# Patient Record
Sex: MALE | Race: WHITE
[De-identification: names, ages, dates, MRNs, and addresses within clinical notes are randomized per-mention and may not be internally consistent; named-entity substitution may affect disease eponyms.]

---

## 2017-06-26 ENCOUNTER — HOSPITAL ENCOUNTER (OUTPATIENT)
Dept: HOSPITAL 45 - C.RAD | Age: 82
Discharge: HOME | End: 2017-06-26
Attending: INTERNAL MEDICINE
Payer: COMMERCIAL

## 2017-06-26 DIAGNOSIS — R13.12: Primary | ICD-10-CM

## 2017-06-26 DIAGNOSIS — R63.3: ICD-10-CM

## 2017-06-26 NOTE — SWALLOWING EVALUATION
HISTORY: This 83 year-old man was referred for a VFSS at [facility] in 
order to address c/o solid food dysphagia and occasional nasal reguritation. He reported 
being able to clear globus sensation with water. Patient has a PMH significant for 
coronary artery disease, diabetes, CABG, hernia repair, right hip replacement. He also 
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  He had no 
information regarding results of EGD.  Currently the patient's diet level is regular, but 
he reports needing to eat foods that are moist/slippery and needing to drink a lot of 
water with meals. 

PROCEDURE: The patient was seen in the Radiology Department of [facility] for the VFSS. Cursory examination of the oral cavity revealed adequate dentition. 
Movement of the articulators was WNL.

The patient was seated on a stool and was viewed in both the Anterior-Posterior (A-P) and 
Lateral planes. Volitional phonation exercises completed in the A-P plane revealed 
bilateral vocal fold movement and vocal intensity within functional limits.

In the lateral plane, the patient was given the following boluses: 1 tsp. thin liquid 
barium x 2, sequential swallows of thin liquid barium self-presented from a cup, 1 tsp. 
nectar-thick liquid barium, single swallow nectar-thick liquid barium self-presented from 
a cup, 1 tsp. barium pudding, and 1 club cracker with barium pudding.

The patient was then repositioned into the A-P plane and given 1 tsp. barium pudding.

RESULTS: 

Oral Stage: Labial closure and tongue control were adequate. Mastication was timely and 
efficient. Bolus transport was timely with brisk tongue motion. There was complete oral 
clearance. Swallow was timely with the head of the bolus at the posterior angle of ramus 
at the first hyoid excursion. 

Pharyngeal Stage: Soft palate elevation was complete. There was partial laryngeal 
elevation with partial superior movement of the thyroid cartilage/partial approximation of 
arytenoids to epiglottic petiole. Hyoid excursion was complete. Epiglottic inversion was 
complete. Laryngeal vestibular closure was incomplete with a narrow column of contrast 
present. Pharyngeal stripping wave was present but diminished. Pharyngeal contraction 
showed bilateral bulging. Pharyngoesophageal segment opening had partial 
distention/partial duration. Tongue base retraction was reduced with a trace column of 
contrast between the tongue base and posterior wall. There was a collection of residue on 
the pharyngeal structures with residue stuck on the posterior pharyngeal wall, valleculae, 
and pyriforms, but was cleared with a second swallow.

Patient showed no aspiration throughout the study. Bilateral bulging in pharyngeal 
contraction represents muscle weakness. Patient frequently requires multiple swallows to 
clear boluses from the pharyngeal cavity, but is aware of the problem and alternates solid 
food with sips of water. 

Esophageal Stage: There was mid-distal retention in the esophagus.



SUMMARY/RECOMMENDATIONS: This patient presents with mild pharyngeal dysphagia and s/s 
esophageal dysfunction. The following is recommended:

1. Slippery diet. Choose foods that are moist, loose, and slippery. Avoid foods that are 
thick, pasty, and doughy. Use condiments as needed to make foods slippery.

2. GERD precautions: patient should be seated up fully upright during and for 30 minutes 
after meals. Alternate solids and liquids. Use multiple swallows as necessary to clear 
boluses.

3. Patient may benefit from f/u speech therapy to learn exercises to increase strength of 
swallowing muscles.  This may be available via Home Health or Outpatient Speech Services. 

A summary of the results and recommendations was discussed with patient and understanding 
was verbalized immediately following the study. 

Thank you for referral of this patient. Please contact me at [phone] if any 
additional information is needed

Jesika Leija [facility]

## 2017-06-26 NOTE — DIAGNOSTIC IMAGING REPORT
VIDEO SWALLOW



HISTORY: Dysphagia  INABILITY TO SWALLOW Solids, weight LOSS



TECHNIQUE: Video fluoroscopic evaluation of swallowing was performed in the AP

and lateral projections by the speech pathology staff. The patient is fed

nectar-thick and thin liquid barium, a barium coated wafer, and barium pudding. 



FLUOROSCOPY TIME: 2 minutes.



COMPARISON STUDY:  None.



FINDINGS: There is normal hyoid excursion and epiglottic deflection. No

significant penetration or aspiration identified. Swallowing function is within

normal limits.



IMPRESSION:  



1. No aspiration identified.

2. Please see the speech pathologist report for detailed findings and

recommendations.







Electronically signed by:  Alex Medellin M.D.

6/26/2017 11:58 AM



Dictated Date/Time:  6/26/2017 11:57 AM

## 2018-03-28 ENCOUNTER — HOSPITAL ENCOUNTER (INPATIENT)
Dept: HOSPITAL 45 - C.EDB | Age: 83
LOS: 2 days | Discharge: HOME | DRG: 247 | End: 2018-03-30
Attending: HOSPITALIST | Admitting: HOSPITALIST
Payer: COMMERCIAL

## 2018-03-28 VITALS
WEIGHT: 133.16 LBS | BODY MASS INDEX: 20.18 KG/M2 | WEIGHT: 133.16 LBS | HEIGHT: 68 IN | HEIGHT: 68 IN | BODY MASS INDEX: 20.18 KG/M2

## 2018-03-28 VITALS
OXYGEN SATURATION: 99 % | HEART RATE: 64 BPM | DIASTOLIC BLOOD PRESSURE: 69 MMHG | SYSTOLIC BLOOD PRESSURE: 125 MMHG | TEMPERATURE: 98.06 F

## 2018-03-28 DIAGNOSIS — Z96.641: ICD-10-CM

## 2018-03-28 DIAGNOSIS — I10: ICD-10-CM
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
## 2018-03-28 DIAGNOSIS — Z95.5: ICD-10-CM

## 2018-03-28 DIAGNOSIS — Z95.1: ICD-10-CM
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
## 2018-03-28 DIAGNOSIS — Z79.84: ICD-10-CM

## 2018-03-28 DIAGNOSIS — K22.70: ICD-10-CM

## 2018-03-28 DIAGNOSIS — E11.9: ICD-10-CM

## 2018-03-28 DIAGNOSIS — E03.9: ICD-10-CM

## 2018-03-28 DIAGNOSIS — I25.110: Primary | ICD-10-CM

## 2018-03-28 DIAGNOSIS — T82.857A: ICD-10-CM

## 2018-03-28 DIAGNOSIS — E78.5: ICD-10-CM

## 2018-03-28 DIAGNOSIS — Z85.46: ICD-10-CM

## 2018-03-28 DIAGNOSIS — Z82.49: ICD-10-CM

## 2018-03-28 LAB
ALBUMIN SERPL-MCNC: 3.7 GM/DL (ref 3.4–5)
ALP SERPL-CCNC: 82 U/L (ref 45–117)
ALT SERPL-CCNC: 20 U/L (ref 12–78)
AST SERPL-CCNC: 18 U/L (ref 15–37)
BUN SERPL-MCNC: 17 MG/DL (ref 7–18)
CALCIUM SERPL-MCNC: 8.2 MG/DL (ref 8.5–10.1)
CO2 SERPL-SCNC: 25 MMOL/L (ref 21–32)
CREAT SERPL-MCNC: 1.11 MG/DL (ref 0.6–1.4)
EOSINOPHIL NFR BLD AUTO: 250 K/UL (ref 130–400)
GLUCOSE SERPL-MCNC: 212 MG/DL (ref 70–99)
HCT VFR BLD CALC: 35 % (ref 42–52)
HGB BLD-MCNC: 12.5 G/DL (ref 14–18)
INR PPP: 1 (ref 0.9–1.1)
MCH RBC QN AUTO: 34.8 PG (ref 25–34)
MCHC RBC AUTO-ENTMCNC: 35.7 G/DL (ref 32–36)
MCV RBC AUTO: 97.5 FL (ref 80–100)
PMV BLD AUTO: 9.8 FL (ref 7.4–10.4)
POTASSIUM SERPL-SCNC: 3.9 MMOL/L (ref 3.5–5.1)
PROT SERPL-MCNC: 7.4 GM/DL (ref 6.4–8.2)
PTT PATIENT: 25.9 SECONDS (ref 21–31)
RED CELL DISTRIBUTION WIDTH CV: 12.7 % (ref 11.5–14.5)
RED CELL DISTRIBUTION WIDTH SD: 44.9 FL (ref 36.4–46.3)
SODIUM SERPL-SCNC: 134 MMOL/L (ref 136–145)
WBC # BLD AUTO: 7.32 K/UL (ref 4.8–10.8)

## 2018-03-28 RX ADMIN — INSULIN ASPART SCH UNITS: 100 INJECTION, SOLUTION INTRAVENOUS; SUBCUTANEOUS at 21:00

## 2018-03-28 RX ADMIN — HEPARIN SODIUM SCH UNIT: 10000 INJECTION, SOLUTION INTRAVENOUS; SUBCUTANEOUS at 23:48

## 2018-03-28 NOTE — EMERGENCY ROOM VISIT NOTE
History


Report prepared by Eboni:  iB Ryan


Under the Supervision of:  Dr. Lb Mcbride M.D.


First contact with patient:  13:49


Chief Complaint:  CHEST PAIN


Stated Complaint:  CHEST PAIN


Nursing Triage Summary:  


pt here with intermittent chest pains x one month. pt states today had burning 


in chest into left arm. upon ems arrival pt denied any pain, took 4 baby 

aspirin 


prior to arrival. pt denies any sob. pt states hx of 5 stents, no hx of mi





History of Present Illness


The patient is an 84 year old male who presents to the Emergency Room with 

complaints of now-resolving chest pain that began at 1210 this afternoon, 1 

hour and 40 minutes ago. The patient states that he has been experiencing 

intermittent "tiny sharp" pains for the past couple of months. Today at 1210 

these symptoms worsened. He had a sharp pain that was a 4/10 in severity. The 

patient then developed some pain in the left arm. This episode lasted for about 

30 minutes before resolving spontaneously. There was no nausea or diaphoresis 

at any time. He had some Aspirin prior to arrival. Over the past couple of 

months he has also noticed some unusual shortness of breath with exertion. He 

notes being "dead tired" after walking up his basement steps. Exertion has not 

caused the sharp pains to onset or worsen. He does have a history of 5 cardiac 

stents placed with a Coronary Artery Bypass Graft. He has never had a 

myocardial infarction.





   Source of History:  patient


   Onset:  1 hr 40 min ago


   Position:  chest, arm (left)


   Symptom Intensity:  4/10 in severity


   Quality:  sharp ("tiny sharp")


   Timing:  resolved (resovling ), other (Episode lasted roughly 30 minutes)


   Associated Symptoms:  + SOB, No diaphoresis, No nausea





Review of Systems


See HPI for pertinent positives & negatives. A total of 10 systems reviewed and 

were otherwise negative.





Past Medical & Surgical


Medical Problems:


(1) Dye esophagus


(2) Chest pain


(3) DM type 2 (diabetes mellitus, type 2)


(4) Dyslipidemia


(5) HTN (hypertension)


(6) Hypothyroidism


(7) Iron deficiency anemia


(8) Kidney stone


(9) Kidney stones


(10) Prostate CA


Surgical Problems:


(1) History of right hip replacement


(2) History of right shoulder fracture


(3) Hx of CABG


(4) Hx of heart artery stent


(5) Hx of prostatectomy


(6) Hx of thyroidectomy








Diabetes





Family History


Omitted secondary to age.





Social History


Marital Status:  


Housing Status:  lives with significant other





Current/Historical Medications


Scheduled


Allopurinol (Zyloprim), 300 MG PO DAILY


Aspirin (Aspirin), 81 MG PO DAILY


Atorvastatin (Lipitor), 20 MG PO DAILY


Fluticasone Propionate (Nasal) (Flonase Allergy Relief), 2 SPRAYS KRYSTAL DAILY


Lansoprazole (Prevacid Solutab), 30 MG PO DAILY


Levothyroxine Sodium (Synthroid), 88 MCG PO DAILY


Lisinopril (Zestril), 2.5 MG PO DAILY


Lutein-Zeaxanthin (Ocuvite Lutein 25 25-5 mg), 1 CAP PO DAILY


Metformin Hcl Er (Glucophage Er), 500 MG PO BID





Allergies


Coded Allergies:  


     No Known Allergies (Unverified , 3/28/18)





Physical Exam


Vital Signs











  Date Time  Temp Pulse Resp B/P (MAP) Pulse Ox O2 Delivery O2 Flow Rate FiO2


 


3/28/18 16:06  73 17 154/72 98 Room Air  


 


3/28/18 15:30  69 20 141/74 97 Room Air  


 


3/28/18 15:00  68 12 136/71 97 Room Air  


 


3/28/18 14:27  66 16 140/79 96 Room Air  


 


3/28/18 13:46 36.6 83 16 165/77 97 Room Air  


 


3/28/18 13:44  81      











Physical Exam


GENERAL: Patient is in no acute distress.


HEENT: No acute trauma, normocephalic atraumatic, mucous membranes moist, no 

nasal congestion, no scleral icterus.


NECK: No stridor, no adenopathy, no meningismus, trachea is midline.


LUNGS: Scattered crackles bilaterally, no wheezing. Breath sounds are equal. No 

respiratory distress. 


HEART: Irregular rhythm, with a normal rate, no murmurs. 


ABDOMEN: Soft, nontender, bowel sounds positive, no hernias, no peritonitis.


EXTREMITIES: No cyanosis or edema, full range of motion of all the joints 

without pain or difficulty, no signs for acute trauma.


NEUROLOGIC: Oriented x 3, no acute motor or sensory deficits, no focal weakness.


SKIN: No rash, no jaundice, no diaphoresis.





Medical Decision & Procedures


ER Provider


Diagnostic Interpretation:


Radiology results as stated below per my review and radiologist interpretation:





CHEST ONE VIEW PORTABLE





CLINICAL HISTORY: CHEST PAIN dyspnea





COMPARISON STUDY:  No previous studies for comparison.





FINDINGS: Slight interstitial prominence throughout both hemithoraces. No


evidence for well-defined infiltrate. Prior median sternotomy. Postoperative


changes to the left shoulder. Old healed fracture right shoulder with


superimposed degenerative change. 





IMPRESSION:  Nonspecific bilateral interstitial prominence. 

















The above report was generated using voice recognition software.  It may contain


grammatical, syntax or spelling errors.














Electronically signed by:  Alex Medellin M.D.


3/28/2018 2:28 PM





Dictated Date/Time:  3/28/2018 2:27 PM





Laboratory Results











Test


  3/28/18


13:09 3/28/18


14:06


 


Prothrombin Time


  10.4 SECONDS


(9.0-12.0) 


 


 


Prothromb Time International


Ratio 1.0 (0.9-1.1) 


  


 


 


Activated Partial


Thromboplast Time 25.9 SECONDS


(21.0-31.0) 


 


 


Partial Thromboplastin Ratio 1.0  


 


Magnesium Level


  1.9 mg/dl


(1.8-2.4) 


 


 


Total Bilirubin


  0.4 mg/dl


(0.2-1) 


 


 


Aspartate Amino Transf


(AST/SGOT) 18 U/L (15-37) 


  


 


 


Alanine Aminotransferase


(ALT/SGPT) 20 U/L (12-78) 


  


 


 


Alkaline Phosphatase


  82 U/L


() 


 


 


Total Protein


  7.4 gm/dl


(6.4-8.2) 


 


 


Albumin


  3.7 gm/dl


(3.4-5.0) 


 


 


Globulin


  3.7 gm/dl


(2.5-4.0) 


 


 


Albumin/Globulin Ratio 1.0 (0.9-2)  


 


Bedside Troponin I


  


  < 0.030 ng/ml


(0-0.045)





Laboratory results reviewed by me.





ECG Per My Interpretation


Indication:  chest pain


Rate (beats per minute):  70


Rhythm:  normal sinus


Findings:  other (No CRUZ, No PVCs)





ED Course


1351: The patient was evaluated in room B8. A complete history and physical 

exam was performed.





1509: I discussed the case with Dr. Lambert - Cardiology. He suggests discussing 

the case with the patient and seeing if he would like me to talk to Dr. Oleary - Patient's cardiologist from [place], and see what he would like 

to have done. 





1518: I discussed the case with the patient. He would like that I consult Dr. Villafuerte - [place] [facility]. 





1528: I discussed the case with Dr. Noy Carcamo. He 

thinks we should have the patient brought in to the hospital for a 

catheterization. 





1546: I discussed the case with Trina Schreckengost - [facility] PA-

C. She will evaluate the patient for further treatment. I made Dr. Lambert aware 

of this development.





Medical Decision


Differential Diagnosis includes; angina, myocardial infarction, anemia, 

electrolyte imbalance, aortic dissection, pulmonary embolism, musculoskeletal 

pain. 





There is no leukocytosis or concerning anemia.  No significant electrolyte 

abnormality, kidney failure or hepatitis.  EKG shows a sinus rhythm with PACs, 

no acute ischemia.  Cardiac enzyme testing 1 is not consistent with acute 

cardiac injury.  Chest x-ray does not show pneumonia, pneumothorax or 

mediastinal widening.





The patient presents with chest pain.  The pain is now resolved.  He does have 

a long cardiac history.  [facility]  

They recommended I discuss the case with his primary cardiologist in the 

[place] area.  I did discuss the case with his cardiologist and he actually 

recommended a hospital stay for further workup, possibly a cardiac 

catheterization.





I talked to the patient, he did consent to a hospital stay.  I discussed the 

case with case management.  I did consult the on-call hospitalist and we 

discussed the case.  [facility] cardiology is involved as well.





Medication Reconcilliation


Current Medication List:  was personally reviewed by me





Blood Pressure Screening


Patient's blood pressure:  Elevated blood pressure


Referred to hospitalist.





Consults


Time Called:  1505


Consulting Physician:  Dr. Fausto Carcamo


Returned Call:  1509


I discussed the case with Dr. Fausto Carcamo. He suggests discussing the 

case with Dr. Oleary - Patient's cardiologist from [place], and see what 

he would like to have done.


Additional Consults:  


   Time Called:  1521


   Consulted Physician:  Dr. Noy [facility] Card[facility]


   Returned Call:  1525


Additional Comments:


I discussed the case with Dr. Noy Carcamo. He thinks we 

should have the patient brought in to the hospital for a catheterization.


   Time Called:  1542


   Consulted Physician:   Trina Schreckengost - [facility] PA-C


   Returned Call:  1540


Additional Comments:


I discussed the case with Trina Schreckengost - [facility] PA-C. She 

will evaluate the patient for further treatment.





Impression





 Primary Impression:  


 Precordial chest pain





Scribe Attestation


The scribe's documentation has been prepared under my direction and personally 

reviewed by me in its entirety. I confirm that the note above accurately 

reflects all work, treatment, procedures, and medical decision making performed 

by me.





Departure Information


Dispostion


Being Evaluated By Hospitalist





Referrals


Jennifer Martines M.D. (PCP)





Patient Instructions


My [facility]

## 2018-03-28 NOTE — DIAGNOSTIC IMAGING REPORT
CHEST ONE VIEW PORTABLE



CLINICAL HISTORY: CHEST PAIN dyspnea



COMPARISON STUDY:  No previous studies for comparison.



FINDINGS: Slight interstitial prominence throughout both hemithoraces. No

evidence for well-defined infiltrate. Prior median sternotomy. Postoperative

changes to the left shoulder. Old healed fracture right shoulder with

superimposed degenerative change. 



IMPRESSION:  Nonspecific bilateral interstitial prominence. 











The above report was generated using voice recognition software.  It may contain

grammatical, syntax or spelling errors.









Electronically signed by:  Alex Medellin M.D.

3/28/2018 2:28 PM



Dictated Date/Time:  3/28/2018 2:27 PM

## 2018-03-28 NOTE — HISTORY AND PHYSICAL
History & Physical


Date & Time of Service:


Mar 28, 2018 at 16:44


Chief Complaint:


Chest Pain


Primary Care Physician:


Riley Castellon D.O.


History of Present Illness


Source:  patient, clinic records, hospital records


Pt is 83 y/o M with PMH HTN, dyslipidemia, CAD s/p stent & CABG, kidney stones, 

Smart's esophagus, prostate CA s/p prostatectomy, iron deficiency anemia 

requiring iron infusions presented to ER via EMS with c/o CP. Reports approx 12:

10 was watching the news when started with left sided CP with radiation to L 

arm that lasted approx 30 minutes. He chewed 4 baby aspirin. Pain resolved and 

pt reports been pain free since. Denies nausea, vomiting, SOB or diaphoresis 

with this pain today. Pt reports intermittent "twinges" to left chest that last 

a couple of seconds and can occur with exertion or without exertion for past 

couple of months. Reports SOB with exertion for past couple of months also. He 

follows with Dr Villafuerte - cardiologist in [place]. Denies fever/chills, 

diaphoresis, N/V/D/C, HA, dizziness, syncope, vision changes, neck pain, 

orthopnea, palpitations, cough, sore throat, choking, otalgia, rhinorrhea, 

abdominal pain, paresthesias, weakness, extremity edema, rashes, urinary 

symptoms.





8/2017: nuclear stress test: impression: normal myocardial perfusion SPECT 

images without evidence for pharmacologically induced ischemia. Mild global 

left ventricular hypokinesis with left ventricular ejection fraction post 

stress at 41%.





Past Medical/Surgical History


Medical Problems:


(1) Smart esophagus


Status: Chronic  





(2) DM type 2 (diabetes mellitus, type 2)


Status: Chronic  





(3) Dyslipidemia


Status: Chronic  





(4) HTN (hypertension)


Status: Chronic  





(5) Hypothyroidism


Status: Chronic  





(6) Iron deficiency anemia


Status: Chronic  





(7) Kidney stone


Status: Resolved  





(8) Kidney stones


Status: Chronic  





(9) Prostate CA


Status: Chronic  





Surgical Problems:


(1) History of right hip replacement


Status: Resolved  





(2) History of right shoulder fracture


Permanent Comment: and surgical repair


Status: Resolved  





(3) Hx of CABG


Status: Resolved  





(4) Hx of heart artery stent


Status: Resolved  





(5) Hx of prostatectomy


Status: Resolved  





(6) Hx of thyroidectomy


Status: Resolved  








Family History





FH: CAD (coronary artery disease)


FH: diabetes mellitus


FH: lung cancer


FH: stroke





Social History


Smoking Status:  Former Smoker (quit 1992, smoked 0.5ppd x 50 years)


Smokeless Tobacco Use:  No


Alcohol Use:  1 beer a week


Drug Use:  none


Marital Status:  


Housing status:  lives with significant other





Immunizations


History of Influenza Vaccine:  Yes


History of Tetanus Vaccine?:  Unknown


History of Pneumococcal:  No


History of Hepatitis B Vaccine:  Unknown





Allergies


Coded Allergies:  


     No Known Allergies (Unverified , 3/28/18)





Home Medications


Scheduled


Allopurinol (Zyloprim), 300 MG PO DAILY


Aspirin (Aspirin), 81 MG PO DAILY


Atorvastatin (Lipitor), 20 MG PO DAILY


Fluticasone Propionate (Nasal) (Flonase Allergy Relief), 2 SPRAYS KRYSTAL DAILY


Lansoprazole (Prevacid Solutab), 30 MG PO DAILY


Levothyroxine Sodium (Synthroid), 88 MCG PO DAILY


Lisinopril (Zestril), 2.5 MG PO DAILY


Lutein-Zeaxanthin (Ocuvite Lutein 25 25-5 mg), 1 CAP PO DAILY


Metformin Hcl Er (Glucophage Er), 500 MG PO BID





Review of Systems


See HPI for pertinent positives & negatives. All other systems reviewed and 

were otherwise negative





Physical Exam


Vital Signs











  Date Time  Temp Pulse Resp B/P (MAP) Pulse Ox O2 Delivery O2 Flow Rate FiO2


 


3/28/18 15:30  69 20 141/74 97 Room Air  


 


3/28/18 15:00  68 12 136/71 97 Room Air  


 


3/28/18 14:27  66 16 140/79 96 Room Air  


 


3/28/18 13:46 36.6 83 16 165/77 97 Room Air  


 


3/28/18 13:44  81      








General Appearance:  WD/WN, no apparent distress


Head:  normocephalic, atraumatic


Eyes:  normal inspection, PERRL, EOMI, sclerae normal


ENT:  hearing grossly normal, pharynx normal, + pertinent finding (mucous 

membranes moist)


Neck:  supple, no JVD, trachea midline


Respiratory/Chest:  chest non-tender, lungs clear, normal breath sounds, no 

respiratory distress


Cardiovascular:  regular rate, rhythm, no murmur, normal peripheral pulses


Abdomen/GI:  normal bowel sounds, non tender, soft


Extremities/Musculoskelatal:  normal inspection, no calf tenderness, normal 

capillary refill, no pedal edema, normal range of motion


Neurologic/Psych:  alert, normal mood/affect, oriented x 3


Skin:  normal color, warm/dry





Diagnostics


Laboratory Results





Results Past 24 Hours








Test


  3/28/18


13:09 3/28/18


14:06 3/28/18


16:34 3/28/18


16:38 Range/Units


 


 


White Blood Count 7.32    4.8-10.8  K/uL


 


Red Blood Count 3.59    4.7-6.1  M/uL


 


Hemoglobin 12.5    14.0-18.0  g/dL


 


Hematocrit 35.0    42-52  %


 


Mean Corpuscular Volume 97.5      fL


 


Mean Corpuscular Hemoglobin 34.8    25-34  pg


 


Mean Corpuscular Hemoglobin


Concent 35.7


  


  


  


  32-36  g/dl


 


 


RDW Standard Deviation 44.9    36.4-46.3  fL


 


RDW Coefficient of Variation 12.7    11.5-14.5  %


 


Platelet Count 250    130-400  K/uL


 


Mean Platelet Volume 9.8    7.4-10.4  fL


 


Prothrombin Time


  10.4


  


  


  


  9.0-12.0


SECONDS


 


Prothromb Time International


Ratio 1.0


  


  


  


  0.9-1.1  


 


 


Activated Partial


Thromboplast Time 25.9


  


  


  


  21.0-31.0


SECONDS


 


Partial Thromboplastin Ratio 1.0     


 


Sodium Level 134    136-145  mmol/L


 


Potassium Level 3.9    3.5-5.1  mmol/L


 


Chloride Level 101      mmol/L


 


Carbon Dioxide Level 25    21-32  mmol/L


 


Anion Gap 8.0    3-11  mmol/L


 


Blood Urea Nitrogen 17    7-18  mg/dl


 


Creatinine


  1.11


  


  


  


  0.60-1.40


mg/dl


 


Est Creatinine Clear Calc


Drug Dose 43.4


  


  


  


   ml/min


 


 


Estimated GFR (


American) 70.3


  


  


  


   


 


 


Estimated GFR (Non-


American 60.7


  


  


  


   


 


 


BUN/Creatinine Ratio 15.3    10-20  


 


Random Glucose 212    70-99  mg/dl


 


Calcium Level 8.2    8.5-10.1  mg/dl


 


Total Bilirubin 0.4    0.2-1  mg/dl


 


Aspartate Amino Transf


(AST/SGOT) 18


  


  


  


  15-37  U/L


 


 


Alanine Aminotransferase


(ALT/SGPT) 20


  


  


  


  12-78  U/L


 


 


Alkaline Phosphatase 82      U/L


 


Total Protein 7.4    6.4-8.2  gm/dl


 


Albumin 3.7    3.4-5.0  gm/dl


 


Globulin 3.7    2.5-4.0  gm/dl


 


Albumin/Globulin Ratio 1.0    0.9-2  


 


Bedside Troponin I  < 0.030   0-0.045  ng/ml











Diagnostic Radiology


CXR:


IMPRESSION:  Nonspecific bilateral interstitial prominence.





EKG


EKG: sinus rhythm with PAC's. Rate 70


Read by cardiologist:


Sinus rhythm with Premature atrial complexes


Otherwise normal ECG


No previous ECGs available


Confirmed by Kocher, Christopher ([id number]) on 3/28/2018 4:42:05 PM





Impression


Assessment and Plan


CHEST PAIN R/O ACS. Risk factors: HTN, hyperlipidemia, DM, hx tobacco use


Pt had left sided CP with radiation to L arm, lasted 30 minutes. No pain since. 

Took 324mg ASA. Initial POC troponin negative in ER. No acute EKG changes noted.


-Monitor Vitals


-Repeat EKG in am


-Will trend troponin


-Echo


-Cardiology consult - Dr Lambert in to see pt in ER


-lipid panel, continue statin


-ASA


-Nitro prn CP and repeat EKG for CP


-NPO after midnight for possible cardiac stress test or cardiac cath per 

cardiology





DM II


HA1c was 6.7 on 11/2017


-HA1c in am


-hold metformin


-NovoLog sliding scale per protocol





DYSLIPIDEMIA


Lipid panel 11/2017: Total: 146, LDL: 68, HDL: 53, Triglycerides: 126


-lipid panel in am


-continue statin





HYPOTHYROIDISM/HX THYROIDECTOMY


TSH pending


-continue levothyroxine





SMART'S ESOPHAGUS


-continue PPI





HX KIDNEY STONES


-continue allopurinol





HX CHRONIC ANEMIA


Hx iron deficiency anemia, requiring iron infusions Q6months. last reported 

approx 1 month ago. Hgb: 12.5. Baseline approx 12.3


-monitor CBC





HX PROSTATE CA S/P PROSTATECTOMY





DVT Prophylaxis


-Heparin SQ





Disposition


admit Tele


Full, no mechanical ventilation as per discussion with pt


Follows with Dr Castellon for routine care





Pt was seen with Dr Bee. See addendum








Attending Note:





Patient is an 84 yr male with PMH CAD S/P CABG and other problems presents with 

history of left sided chest pain, MINER, and generalized weakness. He reports 

that chest pain to be sharp, radiates to neck and LUE, 4/10 which improved with 

Aspirin and lasted for about 15 minutes. Currently he is chest pain free. 

Denies any other relevant history. EKG did not show any signs of acute 

ischemia. Troponin negative X 2.    





Physical Exam:


Vitals signs as noted above 


General Appearance:Moderately built and nourished, no apparent distress


Head:  normocephalic, Atraumatic 


Eyes:  normal inspection, EOMI, PERRL


Neck:  supple, Trachea midline


Respiratory/Chest: Normal breath sounds, CTA


Cardiovascular: S1, S2, No murmur


Abdomen/GI:Soft, Non tender, Bowel sounds present


Extremities/Musculoskelatal:normal inspection, no edema 


Neurologic/Psych:AAOX3, grossly no focal neurological deficits 


Skin:normal color,warm, Vertical CABG scar on chest, B/L shoulder surgical 

scars 








Assessment and Plan:





Chest Pain: R/O ACS


Risk factors: H/O CAD S/P CABG, HLP, DM II 


Initial troponin:Negative X 2


EKG shows: No signs of acute Ischemia


CXR: Nonspecific bilateral interstitial prominence


Trend serial cardiac enzymes, repeat EKG, fasting lipid panel in AM


Check ECHO 


Start Aspirin, statins 


Oxygen PRN 


Likely to get Stress test or Cardiac Cath in AM


NPO after midnight


Cardiology consulted 





I personally reviewed the record. Patient is interviewed and examined at 

bedside. Patient's care is coordinated with Linette Daniels PA-C. Please 

refer to the documentation above for details of patient's presentation and for 

discussion of other issues.





Resuscitation Status








VTE Prophylaxis


Will order VTE Prophylaxis:  Yes





Additional Copies To


Riley Castellon D.O.

## 2018-03-29 VITALS
HEART RATE: 71 BPM | OXYGEN SATURATION: 93 % | DIASTOLIC BLOOD PRESSURE: 66 MMHG | TEMPERATURE: 98.06 F | SYSTOLIC BLOOD PRESSURE: 121 MMHG

## 2018-03-29 VITALS — SYSTOLIC BLOOD PRESSURE: 131 MMHG | OXYGEN SATURATION: 95 % | DIASTOLIC BLOOD PRESSURE: 64 MMHG | HEART RATE: 72 BPM

## 2018-03-29 VITALS
OXYGEN SATURATION: 96 % | DIASTOLIC BLOOD PRESSURE: 69 MMHG | TEMPERATURE: 97.52 F | HEART RATE: 71 BPM | SYSTOLIC BLOOD PRESSURE: 154 MMHG

## 2018-03-29 VITALS
DIASTOLIC BLOOD PRESSURE: 69 MMHG | OXYGEN SATURATION: 97 % | TEMPERATURE: 97.88 F | SYSTOLIC BLOOD PRESSURE: 123 MMHG | HEART RATE: 66 BPM

## 2018-03-29 VITALS
SYSTOLIC BLOOD PRESSURE: 116 MMHG | DIASTOLIC BLOOD PRESSURE: 71 MMHG | TEMPERATURE: 98.06 F | OXYGEN SATURATION: 94 % | HEART RATE: 63 BPM

## 2018-03-29 VITALS
TEMPERATURE: 98.06 F | SYSTOLIC BLOOD PRESSURE: 114 MMHG | DIASTOLIC BLOOD PRESSURE: 67 MMHG | HEART RATE: 77 BPM | OXYGEN SATURATION: 95 %

## 2018-03-29 VITALS
DIASTOLIC BLOOD PRESSURE: 79 MMHG | TEMPERATURE: 97.88 F | SYSTOLIC BLOOD PRESSURE: 133 MMHG | OXYGEN SATURATION: 97 % | HEART RATE: 79 BPM

## 2018-03-29 VITALS
OXYGEN SATURATION: 96 % | DIASTOLIC BLOOD PRESSURE: 59 MMHG | SYSTOLIC BLOOD PRESSURE: 126 MMHG | TEMPERATURE: 98.24 F | HEART RATE: 67 BPM

## 2018-03-29 VITALS — HEART RATE: 67 BPM | SYSTOLIC BLOOD PRESSURE: 128 MMHG | DIASTOLIC BLOOD PRESSURE: 72 MMHG

## 2018-03-29 VITALS — OXYGEN SATURATION: 95 % | HEART RATE: 79 BPM | SYSTOLIC BLOOD PRESSURE: 138 MMHG | DIASTOLIC BLOOD PRESSURE: 60 MMHG

## 2018-03-29 VITALS
SYSTOLIC BLOOD PRESSURE: 127 MMHG | HEART RATE: 59 BPM | OXYGEN SATURATION: 95 % | DIASTOLIC BLOOD PRESSURE: 72 MMHG | TEMPERATURE: 97.34 F

## 2018-03-29 VITALS — OXYGEN SATURATION: 95 %

## 2018-03-29 VITALS — HEART RATE: 65 BPM | OXYGEN SATURATION: 100 % | DIASTOLIC BLOOD PRESSURE: 76 MMHG | SYSTOLIC BLOOD PRESSURE: 135 MMHG

## 2018-03-29 VITALS
HEART RATE: 66 BPM | DIASTOLIC BLOOD PRESSURE: 65 MMHG | OXYGEN SATURATION: 96 % | SYSTOLIC BLOOD PRESSURE: 119 MMHG | TEMPERATURE: 97.7 F

## 2018-03-29 VITALS — SYSTOLIC BLOOD PRESSURE: 152 MMHG | DIASTOLIC BLOOD PRESSURE: 88 MMHG | HEART RATE: 72 BPM

## 2018-03-29 LAB
BUN SERPL-MCNC: 14 MG/DL (ref 7–18)
CALCIUM SERPL-MCNC: 8.1 MG/DL (ref 8.5–10.1)
CO2 SERPL-SCNC: 27 MMOL/L (ref 21–32)
CREAT SERPL-MCNC: 1.09 MG/DL (ref 0.6–1.4)
EOSINOPHIL NFR BLD AUTO: 233 K/UL (ref 130–400)
GLUCOSE SERPL-MCNC: 140 MG/DL (ref 70–99)
HBA1C MFR BLD: 7.4 % (ref 4.5–5.6)
HCT VFR BLD CALC: 34.5 % (ref 42–52)
HGB BLD-MCNC: 11.3 G/DL (ref 14–18)
KETONES UR QL STRIP: 59 MG/DL
MCH RBC QN AUTO: 31.7 PG (ref 25–34)
MCHC RBC AUTO-ENTMCNC: 32.8 G/DL (ref 32–36)
MCV RBC AUTO: 96.6 FL (ref 80–100)
PH UR: 129 MG/DL (ref 0–200)
PMV BLD AUTO: 9.3 FL (ref 7.4–10.4)
POTASSIUM SERPL-SCNC: 3.6 MMOL/L (ref 3.5–5.1)
RED CELL DISTRIBUTION WIDTH CV: 12.7 % (ref 11.5–14.5)
RED CELL DISTRIBUTION WIDTH SD: 44.5 FL (ref 36.4–46.3)
SODIUM SERPL-SCNC: 135 MMOL/L (ref 136–145)
WBC # BLD AUTO: 6.02 K/UL (ref 4.8–10.8)

## 2018-03-29 PROCEDURE — 4A023N7 MEASUREMENT OF CARDIAC SAMPLING AND PRESSURE, LEFT HEART, PERCUTANEOUS APPROACH: ICD-10-PCS | Performed by: INTERNAL MEDICINE

## 2018-03-29 PROCEDURE — B211110 FLUOROSCOPY OF MULTIPLE CORONARY ARTERIES USING LOW OSMOLAR CONTRAST, LASER INTRAOPERATIVE: ICD-10-PCS | Performed by: INTERNAL MEDICINE

## 2018-03-29 PROCEDURE — 027034Z DILATION OF CORONARY ARTERY, ONE ARTERY WITH DRUG-ELUTING INTRALUMINAL DEVICE, PERCUTANEOUS APPROACH: ICD-10-PCS | Performed by: INTERNAL MEDICINE

## 2018-03-29 PROCEDURE — 3E033PZ INTRODUCTION OF PLATELET INHIBITOR INTO PERIPHERAL VEIN, PERCUTANEOUS APPROACH: ICD-10-PCS | Performed by: INTERNAL MEDICINE

## 2018-03-29 RX ADMIN — INSULIN ASPART SCH UNITS: 100 INJECTION, SOLUTION INTRAVENOUS; SUBCUTANEOUS at 21:07

## 2018-03-29 RX ADMIN — HEPARIN SODIUM SCH UNIT: 10000 INJECTION, SOLUTION INTRAVENOUS; SUBCUTANEOUS at 21:06

## 2018-03-29 RX ADMIN — INSULIN ASPART SCH UNITS: 100 INJECTION, SOLUTION INTRAVENOUS; SUBCUTANEOUS at 07:00

## 2018-03-29 RX ADMIN — ACETAMINOPHEN PRN MG: 325 TABLET ORAL at 23:42

## 2018-03-29 RX ADMIN — ACETAMINOPHEN PRN MG: 325 TABLET ORAL at 19:50

## 2018-03-29 RX ADMIN — Medication SCH MG: at 07:50

## 2018-03-29 RX ADMIN — ALLOPURINOL SCH MG: 300 TABLET ORAL at 07:50

## 2018-03-29 RX ADMIN — PANTOPRAZOLE SCH MG: 40 TABLET, DELAYED RELEASE ORAL at 07:50

## 2018-03-29 RX ADMIN — INSULIN ASPART SCH UNITS: 100 INJECTION, SOLUTION INTRAVENOUS; SUBCUTANEOUS at 17:14

## 2018-03-29 RX ADMIN — FLUTICASONE PROPIONATE SCH SPRAYS: 50 SPRAY, METERED NASAL at 07:51

## 2018-03-29 RX ADMIN — HEPARIN SODIUM SCH UNIT: 10000 INJECTION, SOLUTION INTRAVENOUS; SUBCUTANEOUS at 09:00

## 2018-03-29 RX ADMIN — LISINOPRIL SCH MG: 2.5 TABLET ORAL at 07:50

## 2018-03-29 RX ADMIN — ATORVASTATIN CALCIUM SCH MG: 20 TABLET, FILM COATED ORAL at 07:50
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
## 2018-03-29 NOTE — POST SEDATION ASSESSMENT
Post Sedation Assessment


General


Date of Sedation


Mar 29, 2018.


Vital Signs:





Vital Signs Past 12 Hours








  Date Time  Temp Pulse Resp B/P (MAP) Pulse Ox O2 Delivery O2 Flow Rate FiO2


 


3/29/18 08:00      Room Air  


 


3/29/18 07:25 36.6 66 18 123/69 (87) 97   


 


3/29/18 04:00      Room Air  


 


3/29/18 03:25 36.8 67 15 126/59 (81) 96 Room Air  











Post Procedure Recovery Score


Activity:  (2) Moves 4 extremities *


Respiration:  (2) Deep breath/cough


Circulation:  (2) +/-20% PreAnes Value


Consciousness:  (2) Fully Awake


Oxygen Saturation:  (2) > 92% On Room Air


Post Anesthesia Score:  10





Discharge Sedation


Level of Care:  Fast Track Phase II





Post Sedation Plan


On clinical assessment, the patient appears to have tolerated the sedation 

without complications. Patient is recovering as anticipated.





Patient will continue to be monitored by nursing and may be discharged when 

sedation discharge criteria are met per below protocol. 





Upon Completions of procedure and additional 15 minutes continue every 5 minute 

vital signs and the P.A.R. score; then discharge to a Phase I or Fast Track to 

Phase II per the following guidelines:





* Discharge Patient to appropriate Phase II area if PAR is 8 or greater or 

return to pre- procedure baseline.  The post - procedure orders will be as 

directed. 





* If PAR score is less than 8 or not return to pre-procedure baseline then 

patient will follow Phase I monitoring till PAR is reached for Phase II.  The 

Phase I may be done in procedure room or may call to secure a Phase I area.





* If naloxone or flumazenil are used for reversal, hold in Phase I for an 

additional 60 -120 minutes before discharge to Phase II.  Please call the 

Sedation Physician to re-evaluate and complete post-note for discharge to Phase 

II area. 





Do NOT discharge from procedure sedation or Phase 1 until post- sedation 

evaluation note is complete by procedure /sedation MD





Sedation Discharge Instructions to be given to the patient at discharge to home.

## 2018-03-29 NOTE — PROGRESS NOTE
Medicine Progress Note


Date & Time of Visit:


Mar 29, 2018 at 15:44.


Subjective


Pt was seen and examined


Lying in bed with no distress


Pt said that he feels fine


He just had cardiac cath done


Pt said that he does have any chest pain now


Unable to keep his legs down to prevent any hematoma from the right groin area 


denies any dizziness, palpitation, chest pain and SOB





Objective





Last 8 Hrs








  Date Time  Temp Pulse Resp B/P (MAP) Pulse Ox O2 Delivery O2 Flow Rate FiO2


 


3/29/18 15:40  72 18 131/64 (86) 95 Room Air  


 


3/29/18 15:00  72 16 152/88 (109)    


 


3/29/18 14:30 36.4 71 16 154/69 (97) 96 Room Air  


 


3/29/18 14:00 36.6 79 18 133/79 (97) 97 Room Air  


 


3/29/18 13:30 36.5 66 18 119/65 (83) 96 Room Air  


 


3/29/18 13:15 36.7 63 18 116/71 (86) 94 Room Air  


 


3/29/18 13:04 36.3 59 16 127/72 (90) 95 Room Air  


 


3/29/18 12:50  66 18 128/67 (87) 99 Room Air  


 


3/29/18 12:40  65 18 125/59 (81) 99 Room Air  


 


3/29/18 08:00      Room Air  








Physical Exam:


General- No acute distress


Head-  atraumatic


Eyes- PERRL, EOMI


ENT- oropharynx clear


Neck- supple, no JVD


Lungs- clear to auscultation


Heart- regular rhythm


Abdomen- normal bowel sounds, soft


Extremities- No calf tenderness, right groin area with dressing on.


Neuro- alert, oriented x 3; PERRL, EOMI


Skin- warm & dry


Laboratory Results:





Last 24 Hours








Test


  3/28/18


19:07 3/28/18


20:43 3/29/18


01:00 3/29/18


06:20


 


Troponin I < 0.015 ng/ml   < 0.015 ng/ml  


 


Bedside Glucose  140 mg/dl   140 mg/dl 


 


Test


  3/29/18


07:03 3/29/18


12:21 3/29/18


13:22 


 


 


White Blood Count 6.02 K/uL    


 


Red Blood Count 3.57 M/uL    


 


Hemoglobin 11.3 g/dL    


 


Hematocrit 34.5 %    


 


Mean Corpuscular Volume 96.6 fL    


 


Mean Corpuscular Hemoglobin 31.7 pg    


 


Mean Corpuscular Hemoglobin


Concent 32.8 g/dl 


  


  


  


 


 


RDW Standard Deviation 44.5 fL    


 


RDW Coefficient of Variation 12.7 %    


 


Platelet Count 233 K/uL    


 


Mean Platelet Volume 9.3 fL    


 


Sodium Level 135 mmol/L    


 


Potassium Level 3.6 mmol/L    


 


Chloride Level 103 mmol/L    


 


Carbon Dioxide Level 27 mmol/L    


 


Anion Gap 6.0 mmol/L    


 


Blood Urea Nitrogen 14 mg/dl    


 


Creatinine 1.09 mg/dl    


 


Est Creatinine Clear Calc


Drug Dose 42.4 ml/min 


  


  


  


 


 


Estimated GFR (


American) 71.9 


  


  


  


 


 


Estimated GFR (Non-


American 62.0 


  


  


  


 


 


BUN/Creatinine Ratio 12.8    


 


Random Glucose 140 mg/dl    


 


Estimated Average Glucose 166 mg/dl    


 


Hemoglobin A1c 7.4 %    


 


Calcium Level 8.1 mg/dl    


 


Triglycerides Level 125 mg/dl    


 


Cholesterol Level 129 mg/dl    


 


HDL Cholesterol 45 mg/dl    


 


LDL Cholesterol, Calculated 59 mg/dl    


 


VLDL Cholesterol, Calculated 25 mg/dl    


 


Cholesterol/HDL Ratio 2.9    


 


Thyroid Stimulating Hormone


(TSH) 0.278 uIu/ml 


  


  


  


 


 


Free Thyroxine 1.06 ng/dl    


 


Kaolin Activated Coagulation


Time 


  241 SECONDS 


  


  


 


 


Bedside Glucose   123 mg/dl  











Assessment & Plan


CHEST PAIN 


Risk factors: HTN, Hyperlipidemia, DM, hx tobacco use


Received Aspirin 324mg on admission


Troponinx3 sets negative


Cardiology on board


S/P Successful PCI of mid RCA in-stent restenosis with a single WILLY (3.5 x 18 

Xience extending just distal to prior stent; post-dilated with 3.75 NC).


Continue dual-antiplatelet therapy with aspirin and plavix for at least 12 

months


Continue statin therapy


LDL 59


Monitor in telemetry


ECHO showed 


  *  Normal LV chamber size and wall thickness.


  *  Normal LV systolic function, EF 50-55%.


  *  No segmental left ventricular wall motion abnormalities are noted.


  *  Grade I diastolic dysfunction.


  *  Aortic valve sclerosis moderate, without significant aortic valvular 

stenosis.


  *  Mild mitral regurgitation.


  *  Mild left atrial enlargement.








DM II


Recent Hba1c 7.4 (3/29/18)


Continue holding metformin


NovoLog sliding scale per protocol





DYSLIPIDEMIA


LDL at goal


continue statin





HYPOTHYROIDISM/


HX THYROIDECTOMY


Will decrease levothyroxine  to 75mcg


Check TSH between 4 to 6 weeks





SMART'S ESOPHAGUS


continue PPI





HX KIDNEY STONES


continue allopurinol





HX CHRONIC ANEMIA


Hbg 11.3 


Stable 





HX PROSTATE CA S/P PROSTATECTOMY





DVT Prophylaxis


Heparin SQ





Disposition


Continue monitor in tele


Current Inpatient Medications:





Current Inpatient Medications








 Medications


  (Trade)  Dose


 Ordered  Sig/Mandi


 Route  Start Time


 Stop Time Status Last Admin


Dose Admin


 


 Acetaminophen


  (Tylenol Tab)  650 mg  Q4H  PRN


 PO  3/28/18 16:45


 4/27/18 16:44   


 


 


 Magnesium


 Hydroxide


  (Milk Of


 Magnesia Susp)  30 ml  Q12H  PRN


 PO  3/28/18 16:45


 4/27/18 16:44   


 


 


 Ondansetron HCl


  (Zofran Inj)  4 mg  Q6H  PRN


 IV  3/28/18 16:45


 4/27/18 16:44   


 


 


 Nitroglycerin


  (Nitrostat Tab)  0.4 mg  UD  PRN


 SL  3/28/18 16:45


 4/27/18 16:44   


 


 


 Aspirin


  (Ecotrin Tab)  81 mg  QAM


 PO  3/29/18 09:00


 4/28/18 08:59  3/29/18 07:50


81 MG


 


 Insulin Aspart


  (novoLOG ASPART)  **SLIDING


 SCALE**


 **If C...  ACHS


 SC  3/28/18 21:00


 4/27/18 20:59   


 


 


 Glucose


  (Glucose 40% Gel)  15-30


 GRAMS 15


 GRAMS...  UD  PRN


 PO  3/28/18 16:45


 4/27/18 16:44   


 


 


 Glucose


  (Glucose Chew


 Tab)  4-8


 Tablets 4


 Tabl...  UD  PRN


 PO  3/28/18 16:45


 4/27/18 16:44   


 


 


 Dextrose


  (Dextrose 50%


 50ML Syringe)  25-50ML OF


 50% DW IV


 FOR...  UD  PRN


 IV  3/28/18 16:45


 4/27/18 16:44   


 


 


 Glucagon


  (Glucagon Inj)  1 mg  UD  PRN


 SQ  3/28/18 16:45


 4/27/18 16:44   


 


 


 Allopurinol


  (Zyloprim Tab)  300 mg  DAILY


 PO  3/29/18 09:00


 4/28/18 08:59  3/29/18 07:50


300 MG


 


 Atorvastatin


 Calcium


  (Lipitor Tab)  20 mg  DAILY


 PO  3/29/18 09:00


 4/28/18 08:59  3/29/18 07:50


20 MG


 


 Fluticasone


 Propionate


  (Flonase Nasal


 Spray)  2 sprays  DAILY


 KRYSTAL  3/29/18 09:00


 4/28/18 08:59  3/29/18 07:51


2 SPRAYS


 


 Pantoprazole


 Sodium


  (Protonix Tab)  40 mg  QAM


 PO  3/29/18 09:00


 4/28/18 08:59  3/29/18 07:50


40 MG


 


 Levothyroxine


 Sodium


  (Synthroid Tab)  88 mcg  DAILYBB


 PO  3/29/18 06:00


 4/28/18 06:59   


 


 


 Lisinopril


  (Zestril Tab)  2.5 mg  DAILY


 PO  3/29/18 09:00


 4/28/18 08:59  3/29/18 07:50


2.5 MG


 


 Heparin Sodium


  (Porcine)


  (Heparin Sq 5000


 Unit/0.5ml)  5,000 unit  Q12


 SQ  3/28/18 21:00


 4/27/18 20:59   


 


 


 Miscellaneous


  (Iv Fluids


 Completed)  1 ea  PRN  PRN


 N/A  3/28/18 17:45


 3/28/19 17:44   


 


 


 Sodium Chloride  1,000 ml @ 


 100 mls/hr  Q10H


 IV  3/29/18 13:00


 3/29/18 20:29   


 


 


 Clopidogrel


 Bisulfate


  (plAVix TAB)  75 mg  QAM


 PO  3/30/18 09:00


 4/29/18 08:59

## 2018-03-29 NOTE — PRE SEDATION ASSESSMENT
Pre Sedation Assessment


General


Date of Sedation:


Mar 29, 2018.





Vital Signs Past 12 Hours








  Date Time  Temp Pulse Resp B/P (MAP) Pulse Ox O2 Delivery O2 Flow Rate FiO2


 


3/29/18 08:00      Room Air  


 


3/29/18 07:25 36.6 66 18 123/69 (87) 97   


 


3/29/18 04:00      Room Air  


 


3/29/18 03:25 36.8 67 15 126/59 (81) 96 Room Air  


 


3/29/18 00:00      Room Air  


 


3/28/18 23:15 36.7 64 17 125/69 (87) 99 Room Air  











Review


Cardiovascular:  regular rate, rhythm, no edema, no JVD


Lungs:  lungs clear





Pre-Sedation Airway Assessment


Smoking Status:  Former Smoker (quit 1992, smoked 0.5ppd x 50 years)


Hx of Sleep Apnea:  No


Short Thick Neck:  No


Oral Cavity:  WNL


Mallampati Classification:  Class II


ASA Classification:  Class III





NPO Status


Date of Last Intake of Fluids:  Mar 28, 2018


Time of Last Intake of Fluids:  2330





Procedure Planning


Contraindications for Sedation:  None


Current Medications Reviewed:  Yes





Notes








The planned sedation has been discussed with the patient. Informed Consent was 

obtained.  I have identified the patient, determined the appropriateness of 

sedation and have assessed the patient immediately prior to the procedure.  





All medicine(s) and interventions are by my order.

## 2018-03-29 NOTE — PROGRESS NOTE
DATE: 03/29/2018

 

CARDIOLOGY CONSULTATION AND FOLLOWUP NOTE

 

The patient seen and examined.  Chart, medications, telemetry reviewed.

 

SUBJECTIVE:  The patient has had no further chest discomfort overnight, but

he has been sedentary.  Cardiac enzymes have returned negative.  Notes no

dizziness or lightheadedness.  Notes no syncope or near syncope.  Notes no

arrhythmias on monitor.

 

OBJECTIVE:

VITAL SIGNS:  Heart rate is 66, blood pressure is 123/69.

HEENT:  Normocephalic and atraumatic.  Nares without discharge.  Throat was

clear.

NECK:  Supple without thyromegaly, lymphadenopathy, or JVD.  There are no

carotid bruits.

LUNGS:  Clear to auscultation.

CARDIOVASCULAR:  Regular.  There is no S3 gallop.

ABDOMEN:  Soft, nontender.

EXTREMITIES:  Without cyanosis or clubbing.  There is no peripheral edema. 

There are intact distal pulses.  There is no audible abdominal or femoral

bruits.

 

DATA:  EKG today demonstrates sinus bradycardia with improved T-wave

inversion in inferior leads.  Echocardiogram today demonstrates on

preliminary review hypokinesis of the anterior posterior wall at the base and

midlevel, otherwise preserved LV function, mild mitral insufficiency.

 

LABORATORY STUDIES:  Sodium is 135, potassium 3.6, chloride is 103,

bicarbonate is 27, BUN is 14, creatinine is 1.09, glucose is 140, calcium is

8.1.  Cholesterol is 129, LDL is 59.

 

IMPRESSION:  An 84-year-old male who usually got excellent health and class 1

functional capacity with known coronary artery disease with single-vessel

coronary artery bypass grafting, LIMA to the LAD in 2007, prior stenting of

the left anterior descending and right coronary artery historically, presents

now with a substantial change in exercise capacity, exertional chest

discomfort consistent with angina equivalent.  Discussed with the patient. 

He has been recommended to undergo cardiac catheterization by primary

cardiologist.  I agree with recommendations, procedure and risks explained in

detail to the patient including risks of death, myocardial infarction,

stroke, bleeding, infection, dye reaction, renal, vascular and embolic

injury.

 

PLAN:  Today would be image coronaries and assess need for further

interventions.  The patient is agreeable to plan.  He has been n.p.o. since

before midnight.

## 2018-03-29 NOTE — CARDIAC CATHETERIZATION
Procedure Note


Procedure Date


Mar 29, 2018.





Pre-Procedure Diagnosis


Acute Coronary Syndrome





AUC Score


8





Post-Procedure Diagnosis


Severe CAD, Successful PCI





Procedure(s) Performed


Drug Eluting Stent





Cardiologist


Mian





Assistant(s)


Ro





Estimated Blood Loss


15





Medication(s)


Clopidogrel, Fentanyl, Heparin, Versed





Summary of Findings


Indication: ACS





Access: 6Fr right CFA


Catheters: JR4 guide





Findings:


See cath report from Dr. Lambert from earlier today for full details of patient's 

coronary angiography. 





Patient found to have 90+% mid RCA in-stent restenosis which was thought to be 

likely culprit for acute symptoms. 








-- PCI of RCA--


Antithrombotic therapy: Heparin, Clopidogrel


Procedure:


RCA cannulated with JR4 guide


 50 wire passed across lesion into distal vessel


Intra-stent RCA lesion predilated with 3.0 compliant balloon


Dilated lesion stented with 3.5 x 18 Xience WILLY extending a few mm distal to 

prior stent


Stent post-dilated with 3.75 noncompliant balloon to high atmospheres


IC vasodilators administered for spasm


Post procedure JAIDEN 3 flow, stent well expanded with minimal residual stenosis 

and no apparent cardiac complications.  





Arterial Closure: AngioSeal





Summary:


1. Successful PCI of mid RCA in-stent restenosis with a single WILLY (3.5 x 18 

Xience extending just distal to prior stent; post-dilated with 3.75 NC).





Recommendations:


To PCU for continued monitoring


Loaded with Clopidogrel 600mg in cath lab


Continue dual-antiplatelet therapy for at least 12 months


Continue statin, ASCVD risk factor modification per Dr. Lambert


Consult cardiac Rehab





If recurrent symptoms in future can consider possible PCI of protected ostial 

left main, +/- FFR of ostial RCA





Hemodynamics


Rest Ao:


--


Final Ao:


--


LV:


98/47/68





Recommendations


PCI without planned CABG





Specimens


None





Radiation Exposure (mGy)


2504





Contrast (mls)


131 Visi





Fluids (cc crystalloids)


150





Drains


None





Anesthesia


Moderate





Procedural Complication(s)


None





Disposition


PCU





ACC Data


Cardiac Status


Clinical evaluation leading to the procedure


CAD Presntation:  Unstable angina


Anginal Classification:  CCS III


Heart Failure:  No, NYHA Class: CCS I


Cardiogenic Shock w/in 24Hrs:  No


Cardiac Arrest w/in 24Hrs:  No


Imaging studies past 6 months:  Yes


Stress studies past 6 months:  No





Diagnostic


Physician's Name:  Kiran Lambert M.D.


Status:  Elective





Closure Device


Percutaneous Entry Location:  Femoral


Closure Device:  Angio-Seal


Recommendations:  Medical therapy and/or Counseling


PCI Indication:  Unstable Angina





Lesion


Segment Name:  mid RCA


Culprit Artery:  Yes


Stenosis Prior to Rx (%):  90


Chronic Total Occlusion:  No


IVUS:  No


FFR:  No


Pre-Procedure JAIDEN Flow:  3


Previously Treated Lesion:  Yes


Treated Lesion:  


   Timeframe:  greater than 2 years


   Treated with Stent:  Yes


   In-Stent Restenosis:  Yes


   Stent Type:  Non-WILLY


Lesion Complexity:  Non-High/Non-C


Lesion Length (mm):  12


Thrombus Present:  No


Bifurcation Lesion:  No


Guidewire Across Lesion:  Yes


Guidewire:  


   Stenosis Post-Procedure (%):  0


   Post-Procedure JAIDEN Flow:  3


   Device(s) Deployed:  Yes





Intraprocedure Events


Significant Dissection:  No


Perforation:  No

## 2018-03-29 NOTE — [facility] POST OPERATIVE BRIEF NOTE
Preliminary Procedure Note


Procedure Date


Mar 29, 2018.





Pre-Procedure Diagnosis


Angina





AUC Score


7





Post-Procedure Diagnosis


Severe CAD





Procedure(s) Performed


Coronary Angiography, Left Heart Cath





Cardiologist


Dr. Kiran Lambert





Assistant(s)


SARITA Starr





Estimated Blood Loss


<15





Medication(s)


Lidocaine 1% (local infiltration), Diphenhydramine (25 mg po), Diazepam (5mg po)





Preliminary Findings


Right dominant coronary anatomy


Left main: Moderately long and calcified ostial stenosis 75%


Left anterior descending: Type III vessel with long area of stenting in its 

proximal third.  There is diffuse disease in the proximal third with an 80% in-

stent stenosis.  LIMA graft attaches to the mid vessel and fills it well to the 

apex with competitive flow through the native vessel


LIMA graft to the LAD: Patent with excellent graft anastomosis


Ramus intermedius: This is a small insignificant vessel


Circumflex: Nondominant but with large circumflex and single posterior lateral 

branch.  Mild luminal irregularities are throughout but no obstruction other 

than 20-30% origin


Right coronary artery: Large caliber vessel with extensive stenting in its 

proximal midportion.  At the AV groove and gives rise to a long posterior 

ventricular branch and a long posterior descending artery.  There is a mid 

vessel in-stent stenosis of 90% or greater.


LV angiography not performed


Left ventricular end-diastolic pressure 5





Recommendations


PCI without planned CABG (RUBENS Starr)





Specimens


None





Fluids (cc crystalloids)


50





Anesthesia


Start 1101  End 1137  Oral anxiolytic only





Procedural Complication(s)


None





Disposition


PCU

## 2018-03-29 NOTE — CARDIAC CATH REPORT
PRIMARY CARE PHYSICIAN:  Dr. Castellon.

 

PRIMARY CARDIOLOGIST:  Dr. Villafuerte in [place].

 

INDICATIONS:  Exertional angina, dramatic change in exercise capacity,

history of known coronary disease.

 

PROCEDURE:  Left heart catheterization, coronary angiography.

 

BRIEF HISTORY:  The patient is an 84-year-old male with history of known

coronary artery disease with prior multiple coronary interventions per the

patient in 2005 with stenting to the left anterior descending and right

coronary artery.  The patient ultimately re-presented with restenosis of the

left anterior descending in 2007 and underwent coronary bypass grafting

single vessel, receiving LIMA graft to the LAD.  He has had stable class 1-2

functional capacity for multiple years but notes over the past 1 month's time

substantial decline in overall functional capacity and exertional chest

pressure and tightness.  Symptoms culminated in rest symptoms on the day of

admission on 03/28/2018.  Cardiac markers were negative for myocardial

injury.  The patient manifested no congestive heart failure.  Rest angina was

noted at the time of admission, class 3-4.  No stress testing was performed. 

Echocardiogram done prior to the procedure demonstrated mild hypokinesis of

the inferior and posterior wall, reflecting new finding.

 

ACCESS:  Right femoral artery.

 

CATHETERS:  A 5-Czech arterial sheath, 5-Czech JL4, 5-Czech 3DRC, 5-Czech

straight pigtail.

 

CONTRAST:  Nonionic x91 mL Visipaque.

 

IV FLUIDS:  50 mL normal saline.

 

SEDATION:  Start 11:01, end 11:37.  The patient received only anxiolytic

therapy with Valium 5 mg p.o. and Benadryl 25 mg p.o.

 

MEDICATIONS:  Access site was anesthetized locally with lidocaine 1%.

 

RESULTS:

CORONARY ANGIOGRAPHY:  Note right dominant coronary anatomy is present.

LEFT MAIN:  The left main is moderate in caliber and length.  It gives rise

to a large left anterior descending, trivial ramus intermedius and the left

circumflex.  It is mildly calcified.  There is an ostial narrowing of 60-70%.

LEFT ANTERIOR DESCENDING:  Left anterior descending is type 3 in

distribution.  It gives rise to septal branches in its proximal segment,

followed by a moderately sized diagonal branch at the end of its proximal

third and then courses to terminate beyond the apex, the left internal

mammary artery graft anastomosis with the mid portion in the left anterior

descending and there is competitive filling of the distal vessel.  The

proximal left anterior descending has extensive area of stenting with an

in-stent narrowing of 80% in its mid portion.  The distal vessel has moderate

irregularities but no obstructive disease.  The anastomosis point of the left

internal mammary graft is widely patent.

RAMUS INTERMEDIUS:  This is a trivial vessel and is free of disease.

LEFT CIRCUMFLEX:  Left circumflex is nondominant, gives rise to a large

obtuse marginal with a single posterolateral branch.  There are mild luminal

irregularities, 20-30% narrowing at the origin, otherwise no obstruction.

RIGHT CORONARY ARTERY:  The right coronary is very large caliber vessel,

gives rise to a right ventricular branch in its mid portion, a small acute

marginal branch at the AV groove, a long posterior descending and a long

posterior ventricular branch along the AV groove.  Within the right coronary

artery, there is an area of extensive stenting in its proximal third.  Within

the area of stent, there is a high-grade 90% stenosis at the end of the

proximal third.  The distal vessel has moderate irregularities.

 

LV angiography not performed in an attempt to conserve contrast with normal

overall LV function on echocardiogram.

 

HEMODYNAMICS:  Initial aortic root pressure was 144/53 with a mean of 88.  LV

pressure following coronary angiography was 118/0 with LVEDP of 5.  Closing

aortic root pressure was 137/53 with a mean of 91.

 

RADIATION EXPOSURE:  4.0 minutes of fluoroscopy, 950 milligrays, DAP score

6316.

 

FINAL IMPRESSIONS:

1.  Widely patent left internal mammary artery graft to the mid left anterior

descending.

2.  High-grade stenosis of the mid right coronary artery in area of prior

stenting of 90%.

3.  80% narrowing in the proximal left anterior descending prior to the area

of left anterior descending graft.

4.  Ostial narrowing of the left main, eccentric and calcified, 60%-70%.

 

RECOMMENDATIONS:  The patient is referred for coronary intervention of the

right coronary artery.  If no clinical improvement, consideration may be made

for protected left main intervention.

## 2018-03-29 NOTE — ECHOCARDIOGRAM REPORT
*NOTICE TO RECEIVING PARTY AGENCY**  This information is strictly Confidential and protected under 
[place] law.  [place] law prohibits you from making any further disclosure of this 
information unless further disclosure is expressly permitted by the written consent of the person to 
whom it pertains or is authorized by law.  A general authorization for the release of medical or 
other information is not sufficient for this purpose.  Hospital accepts no responsibility if the 
information is made available to any other person, INCLUDING THE PATIENT.



Interpretation Summary

  *  Name: LJ DEMARCO  Study Date: 2018 09:28 AM  BP: 126/59 mmHg

  *  MRN: [id number]  Patient [facility]: [facility]  HR: 67

  *  : 1933 (M/d/yyyy)  Gender: Male  Height: 67 in

  *  Age: 84 yrs  Ethnicity: [place]  Weight: 136 lb

  *  Ordering Physician: Linette Daniels

  *  Referring Physician: Self, Referred

  *  Performed By: Marimar Naranjo RDCS

  *  Accession# [id number]  Account# [id number]

  *  Reason For Study: Chest Pain

  *  BSA: 1.7 m2

  *  -- Conclusions --

  *  Normal LV chamber size and wall thickness.

  *  Normal LV systolic function, EF 50-55%.

  *  No segmental left ventricular wall motion abnormalities are noted.

  *  Grade I diastolic dysfunction.

  *  Aortic valve sclerosis moderate, without significant aortic valvular stenosis.

  *  Mild mitral regurgitation.

  *  Mild left atrial enlargement.

Procedure Details

  *  A complete two-dimensional transthoracic echocardiogram was performed (2D, M-mode, Doppler and 
color flow Doppler).

Left Ventricle

  *  The left ventricle is normal in size.

  *  There is normal left ventricular wall thickness.

  *  Ejection Fraction = 50-55%.

  *  Left ventricular systolic function is normal.

  *  No segmental left ventricular wall motion abnormalities are noted.

Right Ventricle

  *  The right ventricular cavity size is normal (basal dimension <4.2 cm in right ventricular 
apical 4-chamber view).

  *  The right ventricular systolic function is normal as assessed by tricuspid annular plane 
systolic excursion (TAPSE) (normal >1.5 cm).

Atria

  *  The left atrium is mildly dilated.

  *  Right atrial size is normal.

  *  No ASD detected; PFO is not assessed.

Mitral Valve

  *  The mitral valve anatomy is normal.

  *  There is no mitral valve stenosis.

  *  There is mild mitral regurgitation.

Tricuspid Valve

  *  The tricuspid valve is normal in structure and function.

Aortic Valve

  *  The aortic valve is trileaflet.

  *  Aortic valve sclerosis moderate, without significant aortic valvular stenosis.

  *  There is no significant aortic regurgitation.

Pulmonic Valve

  *  The pulmonary valve is not well seen, but the Doppler examination is normal without significant 
regurgitation or stenosis.

Great Vessels

  *  The aortic root is normal size.

Pericardium/Pleural

  *  There is no pericardial effusion.

Left Ventricular Diastolic Function

  *  Grade I diastolic dysfunction, (abnormal relaxation pattern).



MMode 2D Measurements and Calculations

IVSd 0.78 cm

IVSs 1.1 cm



LVIDd 5.0 cm

LVIDs 3.7 cm

LVPWd 1.2 cm

LVPWs 1.6 cm



IVS/LVPW 0.64 

FS 25.4 %

EDV(Teich) 117.1 ml

ESV(Teich) 58.6 ml

EF(Teich) 49.9 %



EDV(cubed) 123.4 ml

ESV(cubed) 51.2 ml

EF(cubed) 58.5 %

% IVS thick 44.2 %

% LVPW thick 34.5 %





LV mass(C)d 180.5 grams

LV mass(C)dI 105.1 grams/m\S\2

LV mass(C)s 184.0 grams

LV mass(C)sI 107.2 grams/m\S\2



SV(Teich) 58.5 ml

SI(Teich) 34.1 ml/m\S\2

SV(cubed) 72.2 ml

SI(cubed) 42.1 ml/m\S\2



Ao root diam 3.1 cm

Ao root area 7.3 cm\S\2

ACS 1.5 cm

LA dimension 4.0 cm



LA/Ao 1.3 





LVAd ap4 34.0 cm\S\2

LVLd ap4 7.9 cm

EDV(MOD-sp4) 120.1 ml

EDV(sp4-el) 124.3 ml

LVAs ap4 20.8 cm\S\2

LVLs ap4 6.6 cm

ESV(MOD-sp4) 58.3 ml

ESV(sp4-el) 55.6 ml

EF(MOD-sp4) 51.4 %

EF(sp4-el) 55.3 %



LVAd ap2 26.6 cm\S\2

LVLd ap2 8.1 cm

EDV(MOD-sp2) 77.0 ml

EDV(sp2-el) 74.6 ml

LVAs ap2 17.5 cm\S\2

LVLs ap2 6.8 cm

ESV(MOD-sp2) 37.3 ml

ESV(sp2-el) 38.5 ml

EF(MOD-sp2) 51.5 %

EF(sp2-el) 48.4 %



LVLd %diff 2.1 %

EDV(MOD-bp) 96.4 ml

LVLs %diff 2.2 %

ESV(MOD-bp) 47.4 ml

EF(MOD-bp) 50.8 %



SV(MOD-sp4) 61.7 ml

SI(MOD-sp4) 36.0 ml/m\S\2





SV(MOD-sp2) 39.7 ml

SI(MOD-sp2) 23.1 ml/m\S\2



SV(MOD-bp) 49.0 ml

SI(MOD-bp) 28.5 ml/m\S\2



SV(sp4-el) 68.7 ml

SI(sp4-el) 40.0 ml/m\S\2



SV(sp2-el) 36.1 ml

SI(sp2-el) 21.1 ml/m\S\2







Doppler Measurements and Calculations

MV E max hali 54.3 cm/sec

MV A max hali 67.9 cm/sec



MV E/A 0.80 



MV dec time 0.45 sec



Ao V2 max 100.8 cm/sec

Ao max PG 4.1 mmHg

Ao max PG (full) 2.7 mmHg





LV V1 max PG 1.4 mmHg



LV V1 max 59.2 cm/sec



PA V2 max 96.4 cm/sec

PA max PG 3.7 mmHg



PI max hali 164.2 cm/sec

PI max PG 10.8 mmHg

PI dec slope 116.5 cm/sec\S\2

PI P1/2t 412.9 msec

## 2018-03-30 VITALS
HEART RATE: 61 BPM | DIASTOLIC BLOOD PRESSURE: 66 MMHG | OXYGEN SATURATION: 98 % | SYSTOLIC BLOOD PRESSURE: 118 MMHG | TEMPERATURE: 97.7 F

## 2018-03-30 VITALS
OXYGEN SATURATION: 97 % | HEART RATE: 76 BPM | TEMPERATURE: 98.42 F | SYSTOLIC BLOOD PRESSURE: 132 MMHG | DIASTOLIC BLOOD PRESSURE: 64 MMHG

## 2018-03-30 VITALS
TEMPERATURE: 97.7 F | DIASTOLIC BLOOD PRESSURE: 66 MMHG | SYSTOLIC BLOOD PRESSURE: 118 MMHG | OXYGEN SATURATION: 98 % | HEART RATE: 61 BPM

## 2018-03-30 VITALS
DIASTOLIC BLOOD PRESSURE: 65 MMHG | HEART RATE: 68 BPM | SYSTOLIC BLOOD PRESSURE: 116 MMHG | OXYGEN SATURATION: 94 % | TEMPERATURE: 98.06 F

## 2018-03-30 VITALS
TEMPERATURE: 97.88 F | SYSTOLIC BLOOD PRESSURE: 123 MMHG | OXYGEN SATURATION: 98 % | HEART RATE: 75 BPM | DIASTOLIC BLOOD PRESSURE: 74 MMHG

## 2018-03-30 VITALS — OXYGEN SATURATION: 94 %

## 2018-03-30 VITALS — OXYGEN SATURATION: 98 %

## 2018-03-30 RX ADMIN — Medication SCH MG: at 08:06

## 2018-03-30 RX ADMIN — FLUTICASONE PROPIONATE SCH SPRAYS: 50 SPRAY, METERED NASAL at 08:08

## 2018-03-30 RX ADMIN — INSULIN ASPART SCH UNITS: 100 INJECTION, SOLUTION INTRAVENOUS; SUBCUTANEOUS at 08:05

## 2018-03-30 RX ADMIN — INSULIN ASPART SCH UNITS: 100 INJECTION, SOLUTION INTRAVENOUS; SUBCUTANEOUS at 13:03

## 2018-03-30 RX ADMIN — HEPARIN SODIUM SCH UNIT: 10000 INJECTION, SOLUTION INTRAVENOUS; SUBCUTANEOUS at 08:06

## 2018-03-30 RX ADMIN — ATORVASTATIN CALCIUM SCH MG: 20 TABLET, FILM COATED ORAL at 08:06

## 2018-03-30 RX ADMIN — LISINOPRIL SCH MG: 2.5 TABLET ORAL at 08:06

## 2018-03-30 RX ADMIN — PANTOPRAZOLE SCH MG: 40 TABLET, DELAYED RELEASE ORAL at 08:07

## 2018-03-30 RX ADMIN — ALLOPURINOL SCH MG: 300 TABLET ORAL at 08:06

## 2018-03-30 NOTE — PROGRESS NOTE
Medicine Progress Note


Date & Time of Visit:


Mar 30, 2018 at 14:37.


Subjective


Pt was seen and examined 


Pt said that he has not been feeling good like that for about a month


Pt has been working in the hallway with no discomfort


He said that he feels good to go


Denies any chest pain, palpitation, dizziness and SOB





Objective





Last 8 Hrs








  Date Time  Temp Pulse Resp B/P (MAP) Pulse Ox O2 Delivery O2 Flow Rate FiO2


 


3/30/18 12:36 36.5 61 20 118/66 (83) 98 Room Air  


 


3/30/18 12:00      Room Air  


 


3/30/18 08:11 36.9 76 18 132/64 (86) 97   








Physical Exam:


General- No acute distress


Head-  atraumatic


Eyes- PERRL, EOMI


ENT- oropharynx clear


Neck- supple, no JVD


Lungs- clear to auscultation


Heart- regular rhythm


Abdomen- normal bowel sounds, soft


Extremities- No calf tenderness, right groin area with dressing on, no hematoma 

noted on the right groin


Neuro- alert, oriented x 3; PERRL, EOMI


Skin- warm & dry


Laboratory Results:





Last 24 Hours








Test


  3/29/18


16:37 3/29/18


20:05 3/30/18


06:42 3/30/18


11:46


 


Bedside Glucose 239 mg/dl  209 mg/dl  155 mg/dl  133 mg/dl 











Assessment & Plan


CHEST PAIN 


Risk factors: HTN, Hyperlipidemia, DM, hx tobacco use


Received Aspirin 324mg on admission


Troponinx3 sets negative


Cardiology on board


S/P Successful PCI of mid RCA in-stent restenosis with a single WILLY (3.5 x 18 

Xience extending just distal to prior stent; post-dilated with 3.75 NC).


Continue dual-antiplatelet therapy with aspirin and plavix for at least 12 

months


Continue statin therapy


LDL 59, chol 129 and HDL 45


Ok from cardiac standpoint to discharge home


Follow up with cardiology dr. Villafuerte btw 1- 2 weeks


Cardiac rehab consult


ECHO showed 


  *  Normal LV chamber size and wall thickness.


  *  Normal LV systolic function, EF 50-55%.


  *  No segmental left ventricular wall motion abnormalities are noted.


  *  Grade I diastolic dysfunction.


  *  Aortic valve sclerosis moderate, without significant aortic valvular 

stenosis.


  *  Mild mitral regurgitation.


  *  Mild left atrial enlargement.








DM II


Recent Hba1c 7.4 (3/29/18)


Resume metformin on discharge


NovoLog sliding scale per protocol





DYSLIPIDEMIA


LDL at goal


continue statin





HYPOTHYROIDISM/


HX THYROIDECTOMY


Will decrease levothyroxine  to 75mcg


Check TSH between 4 to 6 weeks





SMART'S ESOPHAGUS


continue PPI





HX KIDNEY STONES


continue allopurinol





HX CHRONIC ANEMIA


Hbg 11.3 


Stable 





HX PROSTATE CA S/P PROSTATECTOMY





DVT Prophylaxis


Heparin SQ





Disposition


Discharge home today


Follow up with cardiology with Dr. Villafuerte in 1 to 2 weeks


Follow up with your PCP on 4/30 @ 11:25AM


Cardiac rehab


Consultants:


Cardiology


Current Inpatient Medications:





Current Inpatient Medications








 Medications


  (Trade)  Dose


 Ordered  Sig/Mandi


 Route  Start Time


 Stop Time Status Last Admin


Dose Admin


 


 Acetaminophen


  (Tylenol Tab)  650 mg  Q4H  PRN


 PO  3/28/18 16:45


 4/27/18 16:44  3/29/18 23:42


650 MG


 


 Magnesium


 Hydroxide


  (Milk Of


 Magnesia Susp)  30 ml  Q12H  PRN


 PO  3/28/18 16:45


 4/27/18 16:44   


 


 


 Ondansetron HCl


  (Zofran Inj)  4 mg  Q6H  PRN


 IV  3/28/18 16:45


 4/27/18 16:44   


 


 


 Nitroglycerin


  (Nitrostat Tab)  0.4 mg  UD  PRN


 SL  3/28/18 16:45


 4/27/18 16:44   


 


 


 Aspirin


  (Ecotrin Tab)  81 mg  QAM


 PO  3/29/18 09:00


 4/28/18 08:59  3/30/18 08:06


81 MG


 


 Insulin Aspart


  (novoLOG ASPART)  **SLIDING


 SCALE**


 **If C...  ACHS


 SC  3/28/18 21:00


 4/27/18 20:59  3/30/18 13:03


1 UNITS


 


 Glucose


  (Glucose 40% Gel)  15-30


 GRAMS 15


 GRAMS...  UD  PRN


 PO  3/28/18 16:45


 4/27/18 16:44   


 


 


 Glucose


  (Glucose Chew


 Tab)  4-8


 Tablets 4


 Tabl...  UD  PRN


 PO  3/28/18 16:45


 4/27/18 16:44   


 


 


 Dextrose


  (Dextrose 50%


 50ML Syringe)  25-50ML OF


 50% DW IV


 FOR...  UD  PRN


 IV  3/28/18 16:45


 4/27/18 16:44   


 


 


 Glucagon


  (Glucagon Inj)  1 mg  UD  PRN


 SQ  3/28/18 16:45


 4/27/18 16:44   


 


 


 Allopurinol


  (Zyloprim Tab)  300 mg  DAILY


 PO  3/29/18 09:00


 4/28/18 08:59  3/30/18 08:06


300 MG


 


 Atorvastatin


 Calcium


  (Lipitor Tab)  20 mg  DAILY


 PO  3/29/18 09:00


 4/28/18 08:59  3/30/18 08:06


20 MG


 


 Fluticasone


 Propionate


  (Flonase Nasal


 Spray)  2 sprays  DAILY


 KRYSTAL  3/29/18 09:00


 4/28/18 08:59  3/29/18 07:51


2 SPRAYS


 


 Pantoprazole


 Sodium


  (Protonix Tab)  40 mg  QAM


 PO  3/29/18 09:00


 4/28/18 08:59  3/30/18 08:07


40 MG


 


 Lisinopril


  (Zestril Tab)  2.5 mg  DAILY


 PO  3/29/18 09:00


 4/28/18 08:59  3/30/18 08:06


2.5 MG


 


 Heparin Sodium


  (Porcine)


  (Heparin Sq 5000


 Unit/0.5ml)  5,000 unit  Q12


 SQ  3/28/18 21:00


 4/27/18 20:59  3/30/18 08:06


5,000 UNIT


 


 Miscellaneous


  (Iv Fluids


 Completed)  1 ea  PRN  PRN


 N/A  3/28/18 17:45


 3/28/19 17:44   


 


 


 Clopidogrel


 Bisulfate


  (plAVix TAB)  75 mg  QAM


 PO  3/30/18 09:00


 4/29/18 08:59  3/30/18 08:06


75 MG


 


 Levothyroxine


 Sodium


  (Synthroid Tab)  75 mcg  DAILYBB


 PO  3/30/18 06:00


 4/28/18 06:59  3/30/18 06:22


75 MCG

## 2018-03-30 NOTE — CLINICAL DOCUMENTATION QUERY
DIANELYS Franco :



**** CLINICAL DOCUMENTATION QUERY****



Documentation includes: CHEST PAIN.  This term is not synonymous with angina to a 
professional .  As appropriate, please consider documentation as suggested below.  
Thank you. 



Known CAD with risk factors of age, gender, hypertension, hyperlipidemia, DM, tobacco 
abuse.  Treated with diagnostic and interventional cardiac catheterization.  Underwent 
successful PCI of mid RCA in-stent restenosis with a single WILLY.  To be maintained on DAPT 
for at least 12 months.  Continue statin.  Monitor on telemetry. 



In your clinical opinion is this patient being managed for:

    

                        (  ) Unstable angina

                        (  ) Not Agree



                        (  ) Other explanation of clinical findings (Please Explain)

                        (  ) Unable to determine (Please Define)

                        (  ) Need to Discuss

                        



The medical record reflects the following clinical findings, treatment, and risk factors.  
  



Clinical Indicators: As above

Treatment: Underwent successful PCI of mid RCA in-stent restenosis with a single WILLY.  To 
be maintained on DAPT for at least 12 months.  Continue statin.  Monitor on telemetry. 

Risk Factors:Known CAD with risk factors of age, gender, hypertension, hyperlipidemia, DM, 
tobacco abuse



Please clarify and document your clinical opinion in the progress notes and discharge 
summary. Terms such as "probable", "suspected", "likely", "questionable", "possible", or 
"still to be ruled out" are acceptable. 



*****IF IN AGREEMENT, YOU MUST DOCUMENT ABOVE DIAGNOSTIC STATEMENT IN DAILY PROGRESS NOTES 
AND DISCHARGE SUMMARY. This document is not part of the patient's record.*****

Thank You, Deuce العراقي, [phone]

## 2018-03-30 NOTE — CARDIOLOGY PROGRESS NOTE
DATE: 03/30/2018

 

CARDIOLOGY CONSULTATION FOLLOWUP NOTE

 

The patient was seen and examined.  Chart, medications, and telemetry were

reviewed.

 

SUBJECTIVE:  The patient was ambulatory in the hallways.  Notes no chest pain

or discomfort.  Feels well.  Slept well.  Notes no cardiac or noncardiac

complaints.

 

OBJECTIVE:

VITAL SIGNS:  Heart rate 76 and blood pressure is 132/64.

NECK:  Thin.  There is no jugular venous distention.  There are no carotid

bruits.

LUNGS:  Clear to auscultation.

CARDIOVASCULAR:  Regular.  There is no S3 gallop.

ABDOMEN:  Soft and nontender.

EXTREMITIES:  Without cyanosis or clubbing.  There is no peripheral edema. 

Right femoral artery access site is healed well.

 

DATA:  EKG reveals sinus rhythm with atrial ectopy, nonspecific ST segment

changes only.

 

IMPRESSION:  An 84-year-old male with issues as follows:

1.  Crescendo angina.

2.  Atherosclerotic coronary artery disease, complex history, status post

coronary artery bypass grafting in 2007, history of prior multiple stenting

to left anterior descending and right coronary artery in 2005.

3.  Cardiac catheterization this admission demonstrating high grade stenosis

of greater than 90% within the mid right coronary artery and ostial narrowing

of the left main of 60%.  The patient underwent coronary intervention of the

right coronary artery with an excellent result.

 

RECOMMENDATIONS:  The patient will be continued on usual prehospital

medications.  In addition, clopidogrel has been added to regimen.  Follow up

with primary cardiologist, Dr. Villafuerte in 1-2 week's time.  Noted the

patient that should he have persistent symptoms or recurrence of exertional

fatigue and angina, protected left main and may represent possible

interventional target.

## 2018-03-30 NOTE — DISCHARGE INSTRUCTIONS
Discharge Instructions


Date of Service


Mar 30, 2018.





Admission


Reason for Admission:  Chest Pain





Discharge


Discharge Diagnosis / Problem:  Chest pain





Discharge Goals


Goal(s):  Decrease discomfort, Improve function, Improve disease control





Activity Recommendations


Activity Limitations:  resume your previous activity (as tolerated)





.





Instructions / Follow-Up


Instructions / Follow-Up


Follow up with your cardiology Dr. Villafuerte in 1 to 2 weeks


Follow up with primary care provider Dr. Noble (Dr. Castellon colleague) on 4/30 @ 

11:25AM


Your physician will refer you to cardiac rehab 


Hold metformin for now. OK to resume Metformin on Saturday night or Sunday 

morning


Follow up a healthy diet with low Carb and limiting concentrate sweet intake.


Continue Plavix and aspirin daily for your heart stent.


Notify your physician if you develop any abnormal bleeding while on Plavix such 

as blood in your urine or stools.


Check TSH between 4 to 6 weeks to monitor your thyroid


Fall precaution





Only do light and easy activities for the next 2 to 3 days. Ask for help with 

chores and errands while you recover. Have someone drive you to your 

appointments.


Avoid heavy lifting for a while. Your doctor will provide a specific time frame 

for you.


Take your medicines as directed. Do not skip doses.


Keep yourself hydrate. This is to help flush the contrast dye out of your body.


Check your incisions daily for signs of infection. These include redness, 

swelling, and drainage. 


It is normal to have a small bruise or bump where the catheter was inserted. A 

bruise that is getting larger is not normal and should be reported to your 

doctor.


Do not swim or take baths until the doctor says it's OK.


OK to shower


Keep your groin area clean





Current Hospital Diet


Patient's current hospital diet: AHA Diet (Heart Healthy), Diabetes Type 2 Diet





Discharge Diet


Recommended Diet:  AHA Diet (Heart Healthy), Diabetes Type 2 Diet





Procedures


Procedures Performed:  


Left heart catheterization, coronary angiography.





Pending Studies


Studies pending at discharge:  no





Laboratory Results





Hemoglobin A1c








Test


  3/29/18


07:03 Range/Units


 


 


Estimated Average Glucose 166   mg/dl


 


Hemoglobin A1c 7.4 H 4.5-5.6  %








Lipid Panel








Test


  3/29/18


07:03 Range/Units


 


 


Triglycerides Level 125  0-150  mg/dl


 


Cholesterol Level 129  0-200  mg/dl


 


HDL Cholesterol 45   mg/dl


 


Cholesterol/HDL Ratio 2.9   


 


LDL Cholesterol, Calculated 59   mg/dl











Medical Emergencies








.


Who to Call and When:





Medical Emergencies:  If at any time you feel your situation is an emergency, 

please call 911 immediately.





.





Non-Emergent Contact


Non-Emergency issues call your:  Primary Care Provider


Call Non-Emergent contact if:  you have any medication questions





.


.








"Provider Documentation" section prepared by Edie Coles.








.

## 2018-03-31 NOTE — DISCHARGE SUMMARY
Discharge Summary


Date of Service


Mar 31, 2018.





Discharge Summary


Admission Date:


Mar 29, 2018 at 16:14


Discharge Date:  Mar 30, 2018


Discharge Disposition:  Home


Principal Diagnosis:


Chest pain


Crescendo angina.


Secondary Diagnoses/Problems:





DM II


DYSLIPIDEMIA


HYPOTHYROIDISM/


HX THYROIDECTOMY


SMART'S ESOPHAGUS


HX KIDNEY STONES


HX CHRONIC ANEMIA


HX PROSTATE CA S/P PROSTATECTOMY


Procedures:


Cardiac Cath


S/P Successful PCI of mid RCA in-stent restenosis with a single WILLY (3.5 x 18 

Xience extending just distal to prior stent; post-dilated with 3.75 NC).











ECHO


Interpretation Summary


* Name: LJ DEMARCO  Study Date: 2018 09:28 AM  BP: 126/59 mmHg


* MRN: [id number]  Patient Location: 2402  HR: 67


* : 1933 (M/d/yyyy)  Gender: Male  Height: 67 in


* Age: 84 yrs  Ethnicity: [place]  Weight: 136 lb


* Ordering Physician: Linette Daniels


* Referring Physician: Self, Referred


* Performed By: Marimar Naranjo RDCS


* Accession# [id number]  Account# [id number]


* Reason For Study: Chest Pain


* BSA: 1.7 m2


* -- Conclusions --


* Normal LV chamber size and wall thickness.


* Normal LV systolic function, EF 50-55%.


* No segmental left ventricular wall motion abnormalities are noted.


* Grade I diastolic dysfunction.


* Aortic valve sclerosis moderate, without significant aortic valvular stenosis.


* Mild mitral regurgitation.


* Mild left atrial enlargement.


Procedure Details


* A complete two-dimensional transthoracic echocardiogram was performed (2D, M-

mode, Doppler and color flow Doppler).


Left Ventricle


* The left ventricle is normal in size.


* There is normal left ventricular wall thickness.


* Ejection Fraction = 50-55%.


* Left ventricular systolic function is normal.


* No segmental left ventricular wall motion abnormalities are noted.


Right Ventricle


* The right ventricular cavity size is normal (basal dimension <4.2 cm in right 

ventricular apical 4-chamber view).


* The right ventricular systolic function is normal as assessed by tricuspid 

annular plane systolic excursion (TAPSE) (normal >1.5 cm).


Atria


* The left atrium is mildly dilated.


* Right atrial size is normal.


* No ASD detected; PFO is not assessed.


Mitral Valve


* The mitral valve anatomy is normal.


* There is no mitral valve stenosis.


* There is mild mitral regurgitation.


Tricuspid Valve


* The tricuspid valve is normal in structure and function.


Aortic Valve


* The aortic valve is trileaflet.


* Aortic valve sclerosis moderate, without significant aortic valvular stenosis.


* There is no significant aortic regurgitation.


Pulmonic Valve


* The pulmonary valve is not well seen, but the Doppler examination is normal 

without significant regurgitation or stenosis.


Great Vessels


* The aortic root is normal size.


Pericardium/Pleural


* There is no pericardial effusion.


Left Ventricular Diastolic Function


* Grade I diastolic dysfunction, (abnormal relaxation pattern).


Consultations:


Cardiology





Medication Reconciliation


New Medications:  


Clopidogrel Bisulfate (Clopidogrel) 75 Mg Tab


75 MG PO QAM for 30 Days, #30 TAB





Levothyroxine Sodium (Synthroid) 75 Mcg Tab


75 MCG PO DAILYBB for 30 Days, TAB





 


Continued Medications:  


Allopurinol (Zyloprim) 300 Mg Tab


300 MG PO DAILY





Aspirin (Aspirin) 81 Mg Tab


81 MG PO DAILY





Atorvastatin (Lipitor) 20 Mg Tab


20 MG PO DAILY





Fluticasone Propionate (Nasal) (Flonase Allergy Relief) 50 Mcg/Act Spr


2 SPRAYS KRYSTAL DAILY





Lansoprazole (Prevacid Solutab) 30 Mg Sol


30 MG PO DAILY





Lisinopril (Zestril) 2.5 Mg Tab


2.5 MG PO DAILY





Lutein-Zeaxanthin (Ocuvite Lutein 25 25-5 mg) 1 Cap Cap


1 CAP PO DAILY





Metformin Hcl Er (Glucophage Er) 500 Mg Tab


500 MG PO BID





 


Discontinued Medications:  


Levothyroxine Sodium (Synthroid) 88 Mcg Tab


88 MCG PO DAILY











Admission Information


HPI (per Admitting provider):


Pt is 83 y/o M with PMH HTN, dyslipidemia, CAD s/p stent & CABG, kidney stones, 

Smart's esophagus, prostate CA s/p prostatectomy, iron deficiency anemia 

requiring iron infusions presented to ER via EMS with c/o CP. Reports approx 12:

10 was watching the news when started with left sided CP with radiation to L 

arm that lasted approx 30 minutes. He chewed 4 baby aspirin. Pain resolved and 

pt reports been pain free since. Denies nausea, vomiting, SOB or diaphoresis 

with this pain today. Pt reports intermittent "twinges" to left chest that last 

a couple of seconds and can occur with exertion or without exertion for past 

couple of months. Reports SOB with exertion for past couple of months also. He 

follows with Dr Villafuerte - cardiologist in [place]. Denies fever/chills, 

diaphoresis, N/V/D/C, HA, dizziness, syncope, vision changes, neck pain, 

orthopnea, palpitations, cough, sore throat, choking, otalgia, rhinorrhea, 

abdominal pain, paresthesias, weakness, extremity edema, rashes, urinary 

symptoms.





2017: nuclear stress test: impression: normal myocardial perfusion SPECT 

images without evidence for pharmacologically induced ischemia. Mild global 

left ventricular hypokinesis with left ventricular ejection fraction post 

stress at 41%.


Physical Exam (per Admitting):  


   General Appearance:  WD/WN, no apparent distress


   Head:  normocephalic, atraumatic


   Eyes:  normal inspection, PERRL, EOMI, sclerae normal


   ENT:  hearing grossly normal, pharynx normal, + pertinent finding (mucous 

membranes moist)


   Neck:  supple, no JVD, trachea midline


   Respiratory/Chest:  chest non-tender, lungs clear, normal breath sounds, no 

respiratory distress


   Cardiovascular:  regular rate, rhythm, no murmur, normal peripheral pulses


   Abdomen/GI:  normal bowel sounds, non tender, soft


   Extremities/Musculoskelatal:  normal inspection, no calf tenderness, normal 

capillary refill, no pedal edema, normal range of motion


   Neurologic/Psych:  alert, normal mood/affect, oriented x 3


   Skin:  normal color, warm/dry





Hospital Course


CHEST PAIN 


Risk factors: HTN, Hyperlipidemia, DM, hx tobacco use


Received Aspirin 324mg on admission


Troponinx3 sets negative


Cardiology on board


S/P Successful PCI of mid RCA in-stent restenosis with a single WILLY (3.5 x 18 

Xience extending just distal to prior stent; post-dilated with 3.75 NC).


Continue dual-antiplatelet therapy with aspirin and plavix for at least 12 

months


Continue statin therapy


LDL 59, chol 129 and HDL 45


Ok from cardiac standpoint to discharge home


Follow up with cardiology dr. Villafuerte btw 1- 2 weeks


Cardiac rehab consult


ECHO showed 


  *  Normal LV chamber size and wall thickness.


  *  Normal LV systolic function, EF 50-55%.


  *  No segmental left ventricular wall motion abnormalities are noted.


  *  Grade I diastolic dysfunction.


  *  Aortic valve sclerosis moderate, without significant aortic valvular 

stenosis.


  *  Mild mitral regurgitation.


  *  Mild left atrial enlargement.








DM II


Recent Hba1c 7.4 (3/29/18)


Resume metformin on discharge


NovoLog sliding scale per protocol





DYSLIPIDEMIA


LDL at goal


continue statin





HYPOTHYROIDISM/


HX THYROIDECTOMY


Will decrease levothyroxine  to 75mcg


Check TSH between 4 to 6 weeks





SMART'S ESOPHAGUS


continue PPI





HX KIDNEY STONES


continue allopurinol





HX CHRONIC ANEMIA


Hbg 11.3 


Stable 





HX PROSTATE CA S/P PROSTATECTOMY





DVT Prophylaxis


Heparin SQ





Disposition


Discharge home today


Follow up with cardiology with Dr. Villafuerte in 1 to 2 weeks


Follow up with your PCP on  @ 11:25AM


Cardiac rehab


Total time spent on discharge = 40 minutes


This includes examination of the patient, discharge planning, medication 

reconciliation, and communication with other providers.





Discharge Instructions


Discharge Instructions


Date of Service


Mar 30, 2018.





Admission


Reason for Admission:  Chest Pain





Discharge


Discharge Diagnosis / Problem:  Chest pain





Discharge Goals


Goal(s):  Decrease discomfort, Improve function, Improve disease control





Activity Recommendations


Activity Limitations:  resume your previous activity (as tolerated)





.





Instructions / Follow-Up


Instructions / Follow-Up


Follow up with your cardiology Dr. Villafuerte in 1 to 2 weeks


Follow up with primary care provider Dr. Noble (Dr. Castellon colleague) on  @ 

11:25AM


Your physician will refer you to cardiac rehab 


Hold metformin for now. OK to resume Metformin on Saturday night or  

morning


Follow up a healthy diet with low Carb and limiting concentrate sweet intake.


Continue Plavix and aspirin daily for your heart stent.


Notify your physician if you develop any abnormal bleeding while on Plavix such 

as blood in your urine or stools.


Check TSH between 4 to 6 weeks to monitor your thyroid


Fall precaution





Only do light and easy activities for the next 2 to 3 days. Ask for help with 

chores and errands while you recover. Have someone drive you to your 

appointments.


Avoid heavy lifting for a while. Your doctor will provide a specific time frame 

for you.


Take your medicines as directed. Do not skip doses.


Keep yourself hydrate. This is to help flush the contrast dye out of your body.


Check your incisions daily for signs of infection. These include redness, 

swelling, and drainage. 


It is normal to have a small bruise or bump where the catheter was inserted. A 

bruise that is getting larger is not normal and should be reported to your 

doctor.


Do not swim or take baths until the doctor says it's OK.


OK to shower


Keep your groin area clean





Current Hospital Diet


Patient's current hospital diet: AHA Diet (Heart Healthy), Diabetes Type 2 Diet





Discharge Diet


Recommended Diet:  AHA Diet (Heart Healthy), Diabetes Type 2 Diet





Procedures


Procedures Performed:  


Left heart catheterization, coronary angiography.





Pending Studies


Studies pending at discharge:  no





Laboratory Results





Hemoglobin A1c








Test


  3/29/18


07:03 Range/Units


 


 


Estimated Average Glucose 166   mg/dl


 


Hemoglobin A1c 7.4 H 4.5-5.6  %








Lipid Panel








Test


  3/29/18


07:03 Range/Units


 


 


Triglycerides Level 125  0-150  mg/dl


 


Cholesterol Level 129  0-200  mg/dl


 


HDL Cholesterol 45   mg/dl


 


Cholesterol/HDL Ratio 2.9   


 


LDL Cholesterol, Calculated 59   mg/dl











Medical Emergencies








.


Who to Call and When:





Medical Emergencies:  If at any time you feel your situation is an emergency, 

please call 911 immediately.





.





Non-Emergent Contact


Non-Emergency issues call your:  Primary Care Provider


Call Non-Emergent contact if:  you have any medication questions





.


.








"Provider Documentation" section prepared by Edie Coles.








.





Additional Copies To


Aguilar Noble M.D. Sulman, Scott A., D.O.